# Patient Record
Sex: MALE | Race: WHITE
[De-identification: names, ages, dates, MRNs, and addresses within clinical notes are randomized per-mention and may not be internally consistent; named-entity substitution may affect disease eponyms.]

---

## 2016-02-14 VITALS — SYSTOLIC BLOOD PRESSURE: 113 MMHG | DIASTOLIC BLOOD PRESSURE: 69 MMHG

## 2019-01-18 ENCOUNTER — HOSPITAL ENCOUNTER (OUTPATIENT)
Dept: HOSPITAL 63 - US | Age: 59
Discharge: HOME | End: 2019-01-18
Attending: FAMILY MEDICINE
Payer: COMMERCIAL

## 2019-01-18 DIAGNOSIS — Z87.891: ICD-10-CM

## 2019-01-18 DIAGNOSIS — I73.89: Primary | ICD-10-CM

## 2019-01-18 DIAGNOSIS — E11.9: ICD-10-CM

## 2019-01-18 PROCEDURE — 93922 UPR/L XTREMITY ART 2 LEVELS: CPT

## 2019-01-18 NOTE — RAD
Ankle-brachial indices, 1/18/2019:

 

HISTORY: Peripheral vascular disease, smoker, diabetic

 

Resting CARMELINA measurements were obtained. Normal CARMELINA readings of 1.0 were 

obtained on both sides.

 

IMPRESSION: Normal resting CARMELINA measurements.

 

Electronically signed by: Rick Moritz, MD (1/18/2019 10:44 AM) [facility]

## 2019-12-02 ENCOUNTER — HOSPITAL ENCOUNTER (INPATIENT)
Dept: HOSPITAL 63 - ER | Age: 59
Discharge: HOME | DRG: 391 | End: 2019-12-02
Attending: INTERNAL MEDICINE | Admitting: FAMILY MEDICINE
Payer: COMMERCIAL

## 2019-12-02 VITALS — SYSTOLIC BLOOD PRESSURE: 130 MMHG | DIASTOLIC BLOOD PRESSURE: 76 MMHG

## 2019-12-02 VITALS — HEIGHT: 69 IN | WEIGHT: 164.44 LBS | BODY MASS INDEX: 24.36 KG/M2

## 2019-12-02 VITALS — DIASTOLIC BLOOD PRESSURE: 81 MMHG | SYSTOLIC BLOOD PRESSURE: 150 MMHG

## 2019-12-02 VITALS — DIASTOLIC BLOOD PRESSURE: 71 MMHG | SYSTOLIC BLOOD PRESSURE: 143 MMHG

## 2019-12-02 VITALS — SYSTOLIC BLOOD PRESSURE: 144 MMHG | DIASTOLIC BLOOD PRESSURE: 73 MMHG

## 2019-12-02 DIAGNOSIS — E87.2: ICD-10-CM

## 2019-12-02 DIAGNOSIS — K21.9: Primary | ICD-10-CM

## 2019-12-02 DIAGNOSIS — D72.829: ICD-10-CM

## 2019-12-02 DIAGNOSIS — F17.210: ICD-10-CM

## 2019-12-02 DIAGNOSIS — E83.42: ICD-10-CM

## 2019-12-02 DIAGNOSIS — L29.9: ICD-10-CM

## 2019-12-02 DIAGNOSIS — J44.9: ICD-10-CM

## 2019-12-02 DIAGNOSIS — N18.9: ICD-10-CM

## 2019-12-02 DIAGNOSIS — M19.90: ICD-10-CM

## 2019-12-02 DIAGNOSIS — I24.8: ICD-10-CM

## 2019-12-02 DIAGNOSIS — E78.00: ICD-10-CM

## 2019-12-02 DIAGNOSIS — Z85.46: ICD-10-CM

## 2019-12-02 DIAGNOSIS — I95.9: ICD-10-CM

## 2019-12-02 DIAGNOSIS — E11.22: ICD-10-CM

## 2019-12-02 DIAGNOSIS — I12.9: ICD-10-CM

## 2019-12-02 DIAGNOSIS — Z87.442: ICD-10-CM

## 2019-12-02 DIAGNOSIS — Z90.79: ICD-10-CM

## 2019-12-02 DIAGNOSIS — N17.0: ICD-10-CM

## 2019-12-02 LAB
ALBUMIN SERPL-MCNC: 3.5 G/DL (ref 3.4–5)
ALP SERPL-CCNC: 116 U/L (ref 46–116)
ALT SERPL-CCNC: 30 U/L (ref 16–63)
AMPHETAMINE/METHAMPHETAMINE: (no result)
AMYLASE SERPL-CCNC: 51 U/L (ref 25–115)
ANION GAP SERPL CALC-SCNC: 15 MMOL/L (ref 6–14)
APTT PPP: YELLOW S
AST SERPL-CCNC: 28 U/L (ref 15–37)
BACTERIA #/AREA URNS HPF: 0 /HPF
BARBITURATES UR-MCNC: (no result) UG/ML
BASOPHILS # BLD AUTO: 0 X10^3/UL (ref 0–0.2)
BASOPHILS NFR BLD: 0 % (ref 0–3)
BENZODIAZ UR-MCNC: (no result) UG/L
BILIRUB DIRECT SERPL-MCNC: 0.1 MG/DL (ref 0–0.2)
BILIRUB SERPL-MCNC: 0.5 MG/DL (ref 0.2–1)
BILIRUB UR QL STRIP: (no result)
CA-I SERPL ISE-MCNC: 25 MG/DL (ref 8–26)
CALCIUM SERPL-MCNC: 8.7 MG/DL (ref 8.5–10.1)
CANNABINOIDS UR-MCNC: (no result) UG/L
CHLORIDE SERPL-SCNC: 103 MMOL/L (ref 98–107)
CHOLEST/HDLC SERPL: 6 {RATIO}
CO2 SERPL-SCNC: 26 MMOL/L (ref 21–32)
COCAINE UR-MCNC: (no result) NG/ML
CREAT SERPL-MCNC: 1.5 MG/DL (ref 0.7–1.3)
EOSINOPHIL NFR BLD: 0.2 X10^3/UL (ref 0–0.7)
EOSINOPHIL NFR BLD: 2 % (ref 0–3)
ERYTHROCYTE [DISTWIDTH] IN BLOOD BY AUTOMATED COUNT: 12.8 % (ref 11.5–14.5)
FIBRINOGEN PPP-MCNC: CLEAR MG/DL
GFR SERPLBLD BASED ON 1.73 SQ M-ARVRAT: 47.7 ML/MIN
GLUCOSE SERPL-MCNC: 175 MG/DL (ref 70–99)
GLUCOSE UR STRIP-MCNC: 100 MG/DL
HCT VFR BLD CALC: 52.5 % (ref 39–53)
HDLC SERPL-MCNC: 32 MG/DL (ref 40–60)
HGB BLD-MCNC: 17.7 G/DL (ref 13–17.5)
INFLUENZA A PATIENT: NEGATIVE
INFLUENZA B PATIENT: NEGATIVE
LDLC: 92 MG/DL (ref 0–100)
LIPASE: 135 U/L (ref 73–393)
LYMPHOCYTES # BLD: 5.8 X10^3/UL (ref 1–4.8)
LYMPHOCYTES NFR BLD AUTO: 43 % (ref 24–48)
MAGNESIUM SERPL-MCNC: 1.7 MG/DL (ref 1.8–2.4)
MCH RBC QN AUTO: 33 PG (ref 25–35)
MCHC RBC AUTO-ENTMCNC: 34 G/DL (ref 31–37)
MCV RBC AUTO: 99 FL (ref 79–100)
METHADONE SERPL-MCNC: (no result) NG/ML
MONO #: 0.6 X10^3/UL (ref 0–1.1)
MONOCYTES NFR BLD: 5 % (ref 0–9)
NEUT #: 6.9 X10^3UL (ref 1.8–7.7)
NEUTROPHILS NFR BLD AUTO: 51 % (ref 31–73)
NITRITE UR QL STRIP: (no result)
OPIATES UR-MCNC: (no result) NG/ML
PCP SERPL-MCNC: (no result) MG/DL
PLATELET # BLD AUTO: 259 X10^3/UL (ref 140–400)
POTASSIUM SERPL-SCNC: 3.6 MMOL/L (ref 3.5–5.1)
PROT SERPL-MCNC: 6.8 G/DL (ref 6.4–8.2)
RBC # BLD AUTO: 5.33 X10^6/UL (ref 4.3–5.7)
RBC #/AREA URNS HPF: 0 /HPF (ref 0–2)
SODIUM SERPL-SCNC: 144 MMOL/L (ref 136–145)
SP GR UR STRIP: 1.02
SQUAMOUS #/AREA URNS LPF: (no result) /LPF
THYROID STIM HORMONE (TSH): 7.46 UIU/ML (ref 0.36–3.74)
TRIGL SERPL-MCNC: 384 MG/DL (ref 0–150)
UROBILINOGEN UR-MCNC: 0.2 MG/DL
VLDLC: 76 MG/DL (ref 0–40)
WBC # BLD AUTO: 13.5 X10^3/UL (ref 4–11)
WBC #/AREA URNS HPF: (no result) /HPF (ref 0–4)

## 2019-12-02 PROCEDURE — 93306 TTE W/DOPPLER COMPLETE: CPT

## 2019-12-02 PROCEDURE — 93970 EXTREMITY STUDY: CPT

## 2019-12-02 PROCEDURE — 96365 THER/PROPH/DIAG IV INF INIT: CPT

## 2019-12-02 PROCEDURE — 74019 RADEX ABDOMEN 2 VIEWS: CPT

## 2019-12-02 PROCEDURE — 85025 COMPLETE CBC W/AUTO DIFF WBC: CPT

## 2019-12-02 PROCEDURE — 81001 URINALYSIS AUTO W/SCOPE: CPT

## 2019-12-02 PROCEDURE — 96368 THER/DIAG CONCURRENT INF: CPT

## 2019-12-02 PROCEDURE — 85379 FIBRIN DEGRADATION QUANT: CPT

## 2019-12-02 PROCEDURE — 83605 ASSAY OF LACTIC ACID: CPT

## 2019-12-02 PROCEDURE — G0103 PSA SCREENING: HCPCS

## 2019-12-02 PROCEDURE — 80076 HEPATIC FUNCTION PANEL: CPT

## 2019-12-02 PROCEDURE — 83880 ASSAY OF NATRIURETIC PEPTIDE: CPT

## 2019-12-02 PROCEDURE — 87804 INFLUENZA ASSAY W/OPTIC: CPT

## 2019-12-02 PROCEDURE — 90471 IMMUNIZATION ADMIN: CPT

## 2019-12-02 PROCEDURE — 85730 THROMBOPLASTIN TIME PARTIAL: CPT

## 2019-12-02 PROCEDURE — 83036 HEMOGLOBIN GLYCOSYLATED A1C: CPT

## 2019-12-02 PROCEDURE — 87040 BLOOD CULTURE FOR BACTERIA: CPT

## 2019-12-02 PROCEDURE — 96366 THER/PROPH/DIAG IV INF ADDON: CPT

## 2019-12-02 PROCEDURE — 96372 THER/PROPH/DIAG INJ SC/IM: CPT

## 2019-12-02 PROCEDURE — 71045 X-RAY EXAM CHEST 1 VIEW: CPT

## 2019-12-02 PROCEDURE — 84443 ASSAY THYROID STIM HORMONE: CPT

## 2019-12-02 PROCEDURE — 82550 ASSAY OF CK (CPK): CPT

## 2019-12-02 PROCEDURE — 96375 TX/PRO/DX INJ NEW DRUG ADDON: CPT

## 2019-12-02 PROCEDURE — 90715 TDAP VACCINE 7 YRS/> IM: CPT

## 2019-12-02 PROCEDURE — 94640 AIRWAY INHALATION TREATMENT: CPT

## 2019-12-02 PROCEDURE — 84484 ASSAY OF TROPONIN QUANT: CPT

## 2019-12-02 PROCEDURE — 87340 HEPATITIS B SURFACE AG IA: CPT

## 2019-12-02 PROCEDURE — 96361 HYDRATE IV INFUSION ADD-ON: CPT

## 2019-12-02 PROCEDURE — 83735 ASSAY OF MAGNESIUM: CPT

## 2019-12-02 PROCEDURE — 93005 ELECTROCARDIOGRAM TRACING: CPT

## 2019-12-02 PROCEDURE — 94760 N-INVAS EAR/PLS OXIMETRY 1: CPT

## 2019-12-02 PROCEDURE — 76700 US EXAM ABDOM COMPLETE: CPT

## 2019-12-02 PROCEDURE — 86709 HEPATITIS A IGM ANTIBODY: CPT

## 2019-12-02 PROCEDURE — 85610 PROTHROMBIN TIME: CPT

## 2019-12-02 PROCEDURE — 80307 DRUG TEST PRSMV CHEM ANLYZR: CPT

## 2019-12-02 PROCEDURE — 80061 LIPID PANEL: CPT

## 2019-12-02 PROCEDURE — 80048 BASIC METABOLIC PNL TOTAL CA: CPT

## 2019-12-02 PROCEDURE — 86705 HEP B CORE ANTIBODY IGM: CPT

## 2019-12-02 PROCEDURE — 36415 COLL VENOUS BLD VENIPUNCTURE: CPT

## 2019-12-02 PROCEDURE — 82150 ASSAY OF AMYLASE: CPT

## 2019-12-02 PROCEDURE — 83690 ASSAY OF LIPASE: CPT

## 2019-12-02 PROCEDURE — 96367 TX/PROPH/DG ADDL SEQ IV INF: CPT

## 2019-12-02 PROCEDURE — 86803 HEPATITIS C AB TEST: CPT

## 2019-12-02 NOTE — RAD
EXAM: AP View of the chest

 

DATE: 12/2/2019 12:39 AM

 

INDICATION: Chest pain, vomiting

 

COMPARISON: No Prior

 

FINDINGS:

 

 

The heart is not enlarged.

 

Mediastinal and hilar contours are normal.

 

No focal parenchymal airspace opacity.

 

No pleural effusion or pneumothorax.

 

 

 

IMPRESSION:

1.  No radiographic evidence for acute cardiopulmonary process.

 

Electronically signed by: Haja Dao MD (12/2/2019 1:43 AM) [facility]

## 2019-12-02 NOTE — NUR
Patient admitted from the ER, DX of chest pain and itching. Went over plan of care with 
patient, patient voiced understanding. Call light within reach. Will continue to monitor. 
Patient denies pain.

## 2019-12-02 NOTE — CARD
MR#: [id number]

Account#: [id number]

Accession#: [id number]

Date of Study: 12/02/2019

Ordering Physician: CECILY GARCIA, 

Referring Physician: CECILY GARCIA 

Tech: Kaylene Martinez RDCS





--------------- APPROVED REPORT --------------





EXAM: Two-dimensional and M-mode echocardiogram with Doppler and color Doppler.



Other Information 

Quality : Good

Technically limited study due to  body habitus.



INDICATION

Chest Pain 

Elevated Troponin



2D DIMENSIONS 

RVDd2.5 (2.9-3.5cm)Left Atrium(2D)3.5 (1.6-4.0cm)

IVSd1.2 (0.7-1.1cm)Aortic Root(2D)3.0 (2.0-3.7cm)

LVDd3.7 (3.9-5.9cm)PWd1.1 (0.7-1.1cm)

LVDs2.4 (2.5-4.0cm)FS (%) 30.0 %

SV40.3 mlLVEF(%)60.0 (>50%)



Aortic Valve

AoV Peak Ash.169.2cm/sAoV VTI29.0cm

AO Peak GR.11.4mmHgAO Mean GR.6mmHg

ML   (VTI)2.53cm2



Mitral Valve

MV E Cbzmavor68.5cm/sMV DECEL [id number]

MV A Suvejdqr836.4cm/sE/A  Ratio0.8



Tricuspid Valve

TR P. Rlfmiqaw617qm/sRAP BUKMASOP1mfIv

TR Peak Gr.06seRxGIWA86jsIe



 LEFT VENTRICLE 

The left ventricle is normal size. There is mild concentric left ventricular hypertrophy. The left ve
ntricular systolic function is normal and the ejection fraction is within normal range. The Ejection 
Fraction is 55-60%. There is normal LV segmental wall motion. Transmitral Doppler flow pattern is Gra
de I-abnormal relaxation pattern.



 RIGHT VENTRICLE 

The right ventricle is normal size. The right ventricular systolic function is normal.



 ATRIA 

The left atrium size is normal. The right atrium size is normal. The interatrial septum is intact wit
h no evidence for an atrial septal defect or patent foramen ovale as noted on 2-D or Doppler imaging.




 AORTIC VALVE 

The aortic valve is not well visualized but appears to be functioning normally by Doppler interrogati
on. Doppler and Color Flow revealed no significant aortic regurgitation. There is no significant aort
ic valvular stenosis.



 MITRAL VALVE 

The mitral valve is calcified but opens well. Mitral annular calcification is mild. There is no evide
nce of mitral valve prolapse. There is no mitral valve stenosis. Doppler and Color Flow revealed no m
itral valve regurgitation noted.



 TRICUSPID VALVE 

The tricuspid valve is normal in structure and function. Doppler and Color Flow revealed trace tricus
pid regurgitation. The PA pressure was estimated at 20 mmHg. There is no tricuspid valve stenosis.



 PULMONIC VALVE 

The pulmonic valve is not well visualized. Doppler and Color Flow revealed no pulmonic valvular regur
gitation. There is no pulmonic valvular stenosis.



 GREAT VESSELS 

The aortic root is normal in size. The ascending aorta is normal in size. The IVC is normal in size a
nd collapses >50% with inspiration.



 PERICARDIAL EFFUSION 

There is no evidence of significant pericardial effusion.



Critical Notification

Critical Value: No



<Conclusion>

The left ventricular systolic function is normal and the ejection fraction is within normal range. Th
e Ejection Fraction is 55-60%.

There is mild concentric left ventricular hypertrophy.

There is normal LV segmental wall motion.



Signed by : Stephen Nicole, 

Electronically Approved : 12/02/2019 14:45:14

## 2019-12-02 NOTE — HP
ADMIT DATE:  2019



HISTORY OF PRESENT ILLNESS:  The patient is a 59-year-old  male patient

who presented to the Emergency Room with a complaint of itching all over, got

some epigastric and chest pain, has also some shortness of breath.  He noted his

skin was red and he stated that he has had diarrhea and vomiting.  He has also

hypertension and diffuse severe itching.  The patient denied any new foods or

drugs or chemicals or change in household cleaning agents.  He stated that his

primary discomfort is his itching and the patient stating this discomfort is

severe.  No history of previous similar presentation.  The patient is known to

have type 2 diabetes, prostate cancer, kidney stones and COPD, but denies any

previous heart attack.  Denied any history of ill contact or recent travel.  His

pain was mostly in the epigastric area and lower chest.  Denied any radiation. 

He was extensively investigated in the Emergency Room and his lab work showed

that his white cell count was slightly elevated at 13,500 with normal manual

differential.  His D-dimer was high at 0.81.  However, PT, INR and aPTT were

normal.  His chemistry showed that he has impaired kidney function, has lactic

acidosis with serum lactic acid of 3.8.  His first set of cardiac enzymes showed

troponin to be less than 0.017.  The patient was admitted for further evaluation

and treatment.  His urinalysis and tox screen were both negative and his

influenza A and B were both negative.



PAST MEDICAL HISTORY:  Significant for type 2 diabetes, COPD, nephrolithiasis

and prostate cancer.



PAST SURGICAL HISTORY:  Significant for left eye cataract extraction,

tonsillectomy, prostatectomy and arthroscopic surgery of the left knee.



ALLERGIES:  He has no known drug allergies.



MEDICATIONS:  He is currently on following medications:  He is on metformin

extended release 500 mg twice a day, glimepiride 4 mg twice a day, cinnamon bark

500 mg once a day.



FAMILY HISTORY:  He has 2 brothers and 1 sister older.  Does not know anything

about medical problems.  His father  at the age of 91.  Mother  at the

age of 86 because of endometrial cancer.



SOCIAL HISTORY:  He is , has 3 biological children and 1 step child.  He

smokes 1-1/2 pack a day.  Drives a truck.  He does drink alcohol very

occasionally.



REVIEW OF SYSTEMS:  As per history of present illness.



PHYSICAL EXAMINATION

GENERAL:  When I examined him, he looked well and was clearly in no apparent

respiratory distress.  No pallor, jaundice, cyanosis or thyromegaly.  No jugular

venous distention.  No limb edema.

VITAL SIGNS:  His heart rate was 97, blood pressure was 143/71, temperature was

97.8, respiratory rate 20, and oxygen saturation was 93% on room air.

HEAD, EYES, EARS, NOSE AND THROAT:  Showed normocephalic, atraumatic.

NECK:  Supple.

HEART:  Showed normal first and second heart sounds.  No gallop or murmur.

CHEST:  Clear to auscultation.  No crepitation or rhonchi.

ABDOMEN:  Distended, soft, nontender.

NEUROLOGIC:  He is awake, alert, responding appropriately.  All cranial nerves

intact.

EXTREMITIES:  He moves extremities without difficulty, ambulates without

assistance or assistive devices.



LABORATORY DATA:  Showed a white cell count of 13,500, hemoglobin 17.7,

hematocrit 52.5, MCV 99, and platelet count 259,000 with normal manual

differential.  His prothrombin time was 10.3, INR of 1, aPTT was 21.  D-dimer

was high at 0.81.  His chemistry showed a serum sodium 144, potassium 3.6,

chloride 103, bicarbonate 26, anion gap of 15, BUN 25, creatinine 1.5, estimated

GFR was 47 mL per minute, his glucose 175.  Lactic acid was 3.8, calcium was

8.7, magnesium was 1.7.  Total bilirubin, AST, ALT, alkaline phosphatase were

normal.  His first set of cardiac enzymes showed troponin to be less than 0.017.

 His beta natriuretic peptide was 98.  Total protein was 6.8, albumin 3.5. 

Amylase and lipase were normal.  Urinalysis was essentially unremarkable and

toxic screen was negative.  His influenza A and B were negative.  He did have a

chest x-ray, which showed that mediastinal and hilar contours are normal.  The

heart size is normal.  No focal parenchymal airspace opacity.  No pleural

effusion or pneumothorax.  He did have abdominal x-ray, which showed no abnormal

small or large bowel dilatation, moderate colonic stool content.  No abnormal

soft tissue mass effect, no suspicious calcification are seen.  No free

intraperitoneal gas.  Surgical clips in the pelvis are seen.  Hip joint

degenerative changes are noted.  The abdominal ultrasound was unremarkable and

bilateral lower extremity ultrasounds also showed there is normal Duplex flow,

color flow, and compressibility of all visualized vein segments.  No evidence of

deep vein thrombosis is present.



ASSESSMENT AND PLAN:  The patient was admitted to do 2 more sets of cardiac

enzymes and to consult the Cardiology team.  The patient seemed to have some

form of severe allergic reaction.  There is generalized itching, hypertension. 

The plan is to continue the IV fluids.  We will do 2 more sets of cardiac

enzyme, consult the Cardiology team and decide the further management

accordingly.





______________________________

KAILYN GONZALEZ MD



DR:  WILLIAM/tamara  JOB#:  [id number] / [id number]

DD:  2019 11:10  DT:  2019 11:25

## 2019-12-02 NOTE — PDOC2
CECILY GARCIA APRN 12/2/19 0734:


CARDIAC CONSULT


DATE OF CONSULT


Date Of Consult


DATE: 12/2/19 


TIME: 07:31





REASON FOR CONSULT


Reason for Consult


Chest pain 


Hypotension





REFERRING PHYSICIAN


Referring Physician


Dr. Greene





SOURCE


Source:  Chart review, Patient





HPI


History of Present Illness


This is a 58 yo male who presented secondary to itching, epigastric chest pain, 

nausea/vomiting, diarrhea. Patient reports he went to bed as usual last night 

around 11:00 pm. Had difficulty falling asleep. About an hour later, began 

having significant, diffuse itching. No redness of rash was noted. Went outside 

to see if the could temperature would help the itching. Came back inside and has

a bowel movement. Itching persisted. Noticed that he was sweating. Began having 

dull aching pain in his central chest. Felt a little short of breath and 

nauseated. Woke wife up and she was concerned about the chest pain so she 

brought him into the ED for further evaluation and treatment. Symptoms improved 

without intervention prior to arrival to the ED. Itching resolved completely 

with Benadryl in the ED. No further chest pain, shortness of breath, nausea, 

diaphoresis, or itching since arrival to the ED. Denies any new foods, 

medication, etc...


No prior h/o CAD.





PAST MEDICAL HISTORY


Cardiovascular:  HTN


Pulmonary:  COPD


Renal/:  Prostate Ca.


Endocrine:  Diabetes





PAST SURGICAL HISTORY


Past Surgical History


prostatectomy


Past Surgical History:  Cataract Removal





FAMILY HISTORY


Family History:  Family History Unknown





SOCIAL HISTORY


Smoke:  1 pack per day


ALCOHOL:  rare


Drugs:  None


Lives:  with Family





CURRENT MEDICATIONS


Current Medications





Current Medications


Lactated Ringer's 1,000 ml @  100 mls/hr Q10H IV  Last administered on 12/2/19at

01:03;  Start 12/2/19 at 00:39;  Stop 12/2/19 at 10:38


Ondansetron HCl (Zofran) 8 mg 1X  ONCE IVP  Last administered on 12/2/19at 

01:03;  Start 12/2/19 at 00:45;  Stop 12/2/19 at 02:53;  Status DC


Famotidine (Pepcid) 20 mg 1X  ONCE PO  Last administered on 12/2/19at 01:04;  

Start 12/2/19 at 00:45;  Stop 12/2/19 at 02:52;  Status DC


Methylprednisolone Sodium Succinate (SOLU-Medrol 125MG VIAL) 125 mg 1X  ONCE IV 

Last administered on 12/2/19at 01:03;  Start 12/2/19 at 01:00;  Stop 12/2/19 at 

02:53;  Status DC


Albuterol/ Ipratropium (Duoneb) 3 ml 1X  ONCE NEB  Last administered on 

12/2/19at 01:24;  Start 12/2/19 at 01:00;  Stop 12/2/19 at 02:52;  Status DC


Diphenhydramine HCl (Benadryl) 50 mg 1X  ONCE IVP  Last administered on 

12/2/19at 01:03;  Start 12/2/19 at 01:00;  Stop 12/2/19 at 02:50;  Status DC


Aspirin (Children'S Aspirin) 324 mg 1X  ONCE PO  Last administered on 12/2/19at 

01:04;  Start 12/2/19 at 01:00;  Stop 12/2/19 at 02:51;  Status DC


Lactated Ringer's 1,000 ml @  1,000 mls/hr 1X  ONCE IV  Last administered on 

12/2/19at 02:29;  Start 12/2/19 at 01:15;  Stop 12/2/19 at 02:51;  Status DC


Ceftriaxone Sodium 1 gm/ Sodium Chloride 50 ml @  100 mls/hr 1X  ONCE IV  Last 

administered on 12/2/19at 02:00;  Start 12/2/19 at 01:30;  Stop 12/2/19 at 

02:50;  Status DC


Ceftriaxone Sodium (Rocephin) 1 gm STK-MED ONCE .ROUTE ;  Start 12/2/19 at 

01:52;  Stop 12/2/19 at 01:52;  Status DC


Sodium Chloride 50 ml @ As Directed STK-MED ONCE .ROUTE ;  Start 12/2/19 at 

01:52;  Stop 12/2/19 at 01:53;  Status DC


Metronidazole 100 ml @  100 mls/hr 1X  ONCE IV  Last administered on 12/2/19at 

02:51;  Start 12/2/19 at 02:15;  Stop 12/2/19 at 03:14;  Status DC


Enoxaparin Sodium (Lovenox 60mg Syringe) 60 mg 1X  ONCE SQ  Last administered on

12/2/19at 03:27;  Start 12/2/19 at 02:30;  Stop 12/2/19 at 02:50;  Status DC


Magnesium Sulfate 50 ml @ 25 mls/hr 1X  ONCE IV  Last administered on 12/2/19at 

02:51;  Start 12/2/19 at 02:15;  Stop 12/2/19 at 04:14;  Status DC


Lactated Ringer's 1,000 ml @  1,000 mls/hr 1X  ONCE IV  Last administered on 

12/2/19at 03:58;  Start 12/2/19 at 02:15;  Stop 12/2/19 at 03:14;  Status DC


Ondansetron HCl (Zofran) 4 mg PRN Q4HRS  PRN IV NAUSEA/VOMITING;  Start 12/2/19 

at 02:15;  Stop 12/3/19 at 02:14


Acetaminophen (Tylenol) 650 mg PRN Q4HRS  PRN PO FEVER;  Start 12/2/19 at 02:15;

 Stop 12/3/19 at 02:14


Albuterol/ Ipratropium (Duoneb) 3 ml RTQID NEB  Last administered on 12/2/19at 

05:29;  Start 12/2/19 at 08:00;  Stop 12/3/19 at 07:59


Lactated Ringer's 1,000 ml @  200 mls/hr Q5H IV ;  Start 12/2/19 at 02:15;  Stop

12/2/19 at 03:43;  Status DC


Aspirin (Children'S Aspirin) 81 mg DAILYWBKFT PO ;  Start 12/2/19 at 08:00


Ceftriaxone Sodium 1 gm/ Sodium Chloride 50 ml @ 0 mls/hr Q24H IV ;  Start 

12/2/19 at 21:00


Metronidazole 100 ml @  100 mls/hr Q8HRS IV ;  Start 12/2/19 at 06:00


Famotidine (Pepcid Vial) 20 mg BID IVP ;  Start 12/2/19 at 09:00;  Status UNV


Diphenhydramine HCl (Benadryl) 50 mg QID IV ;  Start 12/2/19 at 09:00


Famotidine (Pepcid Vial) 20 mg DAILY IVP ;  Start 12/2/19 at 09:00


Diphtheria/ Tetanus/Acell Pertussis (Boostrix) 0.5 ml ONCE ONCE VAX IM  Last 

administered on 12/2/19at 03:47;  Start 12/2/19 at 03:00;  Stop 12/2/19 at 

03:01;  Status DC


Albuterol/ Ipratropium (Duoneb) 3 ml STK-MED ONCE .ROUTE ;  Start 12/2/19 at 

05:10;  Stop 12/2/19 at 05:11;  Status DC


Metoprolol Succinate (Toprol Xl) 50 mg Q24H PO ;  Start 12/2/19 at 07:30


Enoxaparin Sodium (Lovenox 80mg Syringe) 70 mg Q12HR SQ ;  Start 12/2/19 at 

09:00





Active Scripts


Active


Reported


Cinnamon (Cinnamon Bark) 500 Mg Capsule 500 Mg PO DAILY


Glimepiride 4 Mg Tablet 4 Mg PO BID


Metformin Hcl Er (Metformin Hcl) 500 Mg Tab.er.24h 500 Mg PO BID





ALLERGIES


Allergies:  


Coded Allergies:  


     No Known Drug Allergies (Unverified , 2/14/16)





ROS


Review of Systems


14 point ROS conducted with pertinent positives noted above in HPI





PHYSICAL EXAM


General:  Alert, Oriented X3, Cooperative, No acute distress


HEENT:  Atraumatic, Mucous membr. moist/pink


Lungs:  Clear to auscultation, Normal air movement


Heart:  Regular rate, Normal S1, Normal S2, Other (2/6 systolic murmur )


Abdomen:  Soft


Extremities:  No edema, Normal pulses


Skin:  No breakdown


Neuro:  Normal speech, Sensation intact


Psych/Mental Status:  Mental status NL, Mood NL


MUSCULOSKELETAL:  Osteoarthritic changes both hands





VITALS


Vital Signs





Vital Signs








  Date Time  Temp Pulse Resp B/P (MAP) Pulse Ox O2 Delivery O2 Flow Rate FiO2


 


12/2/19 05:50 97.8 97 20 143/71 (95) 93 Room Air  


 


12/2/19 02:43       2.0 











LABS


LABS





Laboratory Tests








Test


 12/2/19


00:49 12/2/19


01:36 12/2/19


02:06 12/2/19


03:15


 


White Blood Count


 13.5 x10^3/uL


(4.0-11.0) 


 


 





 


Red Blood Count


 5.33 x10^6/uL


(4.30-5.70) 


 


 





 


Hemoglobin


 17.7 g/dL


(13.0-17.5) 


 


 





 


Hematocrit


 52.5 %


(39.0-53.0) 


 


 





 


Mean Corpuscular Volume 99 fL ()    


 


Mean Corpuscular Hemoglobin 33 pg (25-35)    


 


Mean Corpuscular Hemoglobin


Concent 34 g/dL


(31-37) 


 


 





 


Red Cell Distribution Width


 12.8 %


(11.5-14.5) 


 


 





 


Platelet Count


 259 x10^3/uL


(140-400) 


 


 





 


Neutrophils (%) (Auto) 51 % (31-73)    


 


Lymphocytes (%) (Auto) 43 % (24-48)    


 


Monocytes (%) (Auto) 5 % (0-9)    


 


Eosinophils (%) (Auto) 2 % (0-3)    


 


Basophils (%) (Auto) 0 % (0-3)    


 


Neutrophils # (Auto)


 6.9 x10^3uL


(1.8-7.7) 


 


 





 


Lymphocytes # (Auto)


 5.8 x10^3/uL


(1.0-4.8) 


 


 





 


Monocytes # (Auto)


 0.6 x10^3/uL


(0.0-1.1) 


 


 





 


Eosinophils # (Auto)


 0.2 x10^3/uL


(0.0-0.7) 


 


 





 


Basophils # (Auto)


 0.0 x10^3/uL


(0.0-0.2) 


 


 





 


Prothrombin Time


 10.3 SEC


(9.4-11.4) 


 


 





 


Prothromb Time International


Ratio 1.0 (0.9-1.1) 


 


 


 





 


Activated Partial


Thromboplast Time 21 SEC (23-33) 


 


 


 





 


D-Dimer (Evelyne)


 0.81 mg/L


(0.00-0.50) 


 


 





 


Sodium Level


 144 mmol/L


(136-145) 


 


 





 


Potassium Level


 3.6 mmol/L


(3.5-5.1) 


 


 





 


Chloride Level


 103 mmol/L


() 


 


 





 


Carbon Dioxide Level


 26 mmol/L


(21-32) 


 


 





 


Anion Gap 15 (6-14)    


 


Blood Urea Nitrogen


 25 mg/dL


(8-26) 


 


 





 


Creatinine


 1.5 mg/dL


(0.7-1.3) 


 


 





 


Estimated GFR


(Cockcroft-Gault) 47.7 


 


 


 





 


Glucose Level


 175 mg/dL


(70-99) 


 


 





 


Calcium Level


 8.7 mg/dL


(8.5-10.1) 


 


 





 


Magnesium Level


 1.7 mg/dL


(1.8-2.4) 


 


 





 


Total Bilirubin


 0.5 mg/dL


(0.2-1.0) 


 


 





 


Direct Bilirubin


 0.1 mg/dL


(0.0-0.2) 


 


 





 


Aspartate Amino Transf


(AST/SGOT) 28 U/L (15-37) 


 


 


 





 


Alanine Aminotransferase


(ALT/SGPT) 30 U/L (16-63) 


 


 


 





 


Alkaline Phosphatase


 116 U/L


() 


 


 





 


Creatine Kinase


 134 U/L


() 


 


 





 


Troponin I Quantitative


 < 0.017 ng/mL


(0-0.055) 


 


 





 


NT-Pro-B-Type Natriuretic


Peptide 98 pg/mL


(0-124) 


 


 





 


Total Protein


 6.8 g/dL


(6.4-8.2) 


 


 





 


Albumin


 3.5 g/dL


(3.4-5.0) 


 


 





 


Amylase Level


 51 U/L


() 


 


 





 


Lipase


 135 U/L


() 


 


 





 


Ethyl Alcohol Level


 < 10 mg/dL


(0-10) 


 


 





 


Lactic Acid Level


 


 3.8 mmol/L


(0.4-2.0) 


 





 


Influenza Type A (Rapid)


 


 


 Negative


(NEGATIVE) 





 


Influenza Type B (Rapid)


 


 


 Negative


(NEGATIVE) 





 


Urine Collection Type    Unknown 


 


Urine Color    Yellow 


 


Urine Clarity    Clear 


 


Urine pH    6.0 


 


Urine Specific Gravity    1.020 


 


Urine Protein


 


 


 


 Neg


(NEG-TRACE)


 


Urine Glucose (UA)


 


 


 


 100 mg/dL


(NEG)


 


Urine Ketones (Stick)


 


 


 


 Neg mg/dL


(NEG)


 


Urine Blood    Neg (NEG) 


 


Urine Nitrite    Neg (NEG) 


 


Urine Bilirubin    Neg (NEG) 


 


Urine Urobilinogen Dipstick


 


 


 


 0.2 mg/dL (0.2


mg/dL)


 


Urine Leukocyte Esterase    Neg (NEG) 


 


Urine RBC    0 /HPF (0-2) 


 


Urine WBC    Occ /HPF (0-4) 


 


Urine Squamous Epithelial


Cells 


 


 


 Occ /LPF 





 


Urine Bacteria    0 /HPF (0-FEW) 


 


Urine Opiates Screen    Neg (NEG) 


 


Urine Methadone Screen    Neg (NEG) 


 


Urine Barbiturates    Neg (NEG) 


 


Urine Phencyclidine Screen    Neg (NEG) 


 


Urine


Amphetamine/Methamphetamine 


 


 


 Neg (NEG) 





 


Urine Benzodiazepines Screen    Neg (NEG) 


 


Urine Cocaine Screen    Neg (NEG) 


 


Urine Cannabinoids Screen    Neg (NEG) 


 


Urine Ethyl Alcohol    Neg (NEG) 


 


Test


 12/2/19


05:30 


 


 





 


Lactic Acid Level


 2.7 mmol/L


(0.4-2.0) 


 


 





 


Troponin I Quantitative


 0.126 ng/mL


(0-0.055) 


 


 














ASSESSMENT/PLAN


Assessment/Plan


1.  Pruritis; resolved 


2.  Chest pain, atypical


3.  Mild troponin elevation; highest 0.126. Probable type II, demand ischemia


4.  Hypertension; controlled overall


5.  Diabetes, II


6.  Leukocytosis, lactic acidosis; ? allergic reaction 


7.  ESTELA versus CKD; IVFs


8.  Tobaccoism; discussed/encouraged cessation








Recommendations





Lipids, TSH


Trend troponin


Echo to assess LV systolic function


Will need further ischemic workup based upon risk factors, possibly as an 

outpatient.


Further pending above





EMMA LUTZ MD 12/2/19 1519:


CARDIAC CONSULT


ASSESSMENT/PLAN


Assessment/Plan


Patient seen and examined.  Agree with NP's assessment and plan.


CP with atypical features.


Slight trop elevation probably demand ischemia


2D echo showed normal LVF without any WMA


Plan ischemic evaluation as an outpatient


Thank you for your consultation











CECILY GARCIA            Dec 2, 2019 07:34


EMMA LUTZ MD            Dec 2, 2019 16:43

## 2019-12-02 NOTE — RAD
Examination:  Bilateral Lower Extremity Venous Doppler Ultrasound

 

History: Elevated d-dimer

 

Comparison: None

 

Procedure: Gray scale, color flow 2D and spectal waveform analysis images 

are obtained with and without compression in the area of the common 

femoral vein, superficial femoral vein - femoral vein junction, main 

femoral vein (superficial femoral vein) and popliteal vein. Veins of the 

proximal calf are also imaged.

 

Findings:

 

There is normal duplex flow, color flow and compressibility of all 

visualized vein segments. No evidence of deep venous thrombus is present.

 

Impression: 

 

No evidence of DVT in the visualized bilateral lower extremity venous 

system.

 

Electronically signed by: Filipe Woo MD (12/2/2019 8:44 AM) [id number]

## 2019-12-02 NOTE — EKG
[facility] [address], [place] [address]

Test Date:    2019               Test Time:    07:13:04

Pat Name:     PARUL CATHERINE              Department:   

Patient ID:   [id number]           Room:          

Gender:       M                        Technician:   

:          [id number]               Requested By: ELBA VELEZ

Order Number: [id number]            Reading MD:     

                                 Measurements

Intervals                              Axis          

Rate:         93                       P:            52

NY:           140                      QRS:          59

QRSD:         76                       T:            34

QT:           356                                    

QTc:          445                                    

                           Interpretive Statements

SINUS RHYTHM

NORMAL ECG

RI6.02

No previous ECG available for comparison

## 2019-12-02 NOTE — RAD
EXAM: AP views of the abdomen in upright and supine positions. 

 

CLINICAL INDICATION: Nausea, vomiting, pain, tremors

 

COMPARISON: None.

 

FINDINGS and 

IMPRESSION: 

No abnormal small or large bowel dilatation.  Moderate colonic stool 

content.  No abnormal soft tissue mass effect.  No suspicious 

calcifications are seen.  No free intraperitoneal gas. Surgical clips in 

the pelvis are seen. Hip joint degenerative changes are noted.

 

Electronically signed by: Haja Dao MD (12/2/2019 2:05 AM) [facility]

## 2019-12-02 NOTE — RAD
Complete abdomen ultrasound study

 

Clinical indications: Elevated d-dimer. Dyspnea. History of renal stone.

 

FINDINGS: The pancreas is poorly visualized due to overlying bowel gas. No

obvious focal enlargement of the pancreas is seen otherwise. The 

intrahepatic portion of the IVC is unremarkable. No focal aneurysmal 

dilatation of the abdominal aorta is seen. The liver measures 17.3 cm in 

length which is normal. No focal hepatic mass is seen. No gallstone is 

seen within the gallbladder. The gallbladder wall measures 2 mm in 

thickness which is normal. The extra hepatic bile duct measures 4 mm in 

caliber which is normal. The length of the right kidney is 11.2 cm and the

length of the left kidney is 11.0 cm. No hydronephrosis or renal mass or 

perinephric fluid collection is seen on either side. The spleen measures 

10 cm in length which is normal. No ascites is seen within the upper 

abdomen.

 

IMPRESSION: Unremarkable study.

 

Electronically signed by: Leander Jiang MD (12/2/2019 8:53 AM) [facility]

## 2019-12-02 NOTE — NUR
NURSING NOTE



PT DISCHARGED TO HOME VIA AMBULATION ACCOMPANIED BY WIFE. PT GIVEN WRITTEN AND VERBAL 
DISCHARGE INSTRUCTIONS AND TO FOLLOW UP WITH CARDIOLOGY AND PCP. NO SCRIPTS GIVEN.



MINNIE PENG.

## 2019-12-02 NOTE — PHYS DOC
Past History


Past Medical History:  Arthritis, Cancer, COPD, Diabetes, GERD, High 

Cholesterol, Kidney Stones, Other


Past Medical History


Prostate CA


Past Surgical History:  Cancer Surgery, Other


Past Surgical History


Prostatectomy 


Smoking:  Cigarettes


Alcohol Use:  Rarely


Drug Use:  None





Adult General


Chief Complaint


Chief Complaint:  ".. I was just laying in bed.. and it felt like my upper lip 

was numb.. and I started itching every where.. got this epigastric pain.. chest 

pain.. some shortness of breath.. I could get comfortable.. I notice my skin is 

red.... I feel like shit...."  Pt.   " He was fine at 9 pm.. then woke me up not

fine...I guess he does not want me to get any sleep...We ate the same thing .. 

no other changes..." Wife





HPI


HPI





Patient is a 59 year old male who presents with above hx and complaints of 

chest- epigastric pain, nausea, dyspnea, vomiting, diarrhea, tachycardia, 

chills, hypotension and diffuse severe itching.  Patient denies any new foods, 

drugs, chemicals or changes in household cleaning agents. Patient states his 

primary discomfort is his itching. Patient states this discomfort is severe.  No

history of previous type presentations.  Patient does have a history of 

diabetes, prostate cancer, kidney stones and COPD. Patient denies previous heart

attacks. Patient does  not have a history of  ill contacts or travel.  Patient 

does smoke heavily ,with estimated over 100 pack yr hx.  Pt. localizes chest 

pain in epigastric lower sternal  area.  Some radiation up under the sternum.  

Patient works at a local [employer]. Patient follows with Dr. Lee





Review of Systems


Review of Systems





Constitutional: Acute rigors and  chills []


Eyes: Denies change in visual acuity, redness, or eye pain []


HENT: Denies nasal congestion or sore throat []


Respiratory: History of cough and shortness of breath []


Cardiovascular: No additional information not addressed in HPI []


GI: Epigastric abdominal pain, nausea, vomiting,. Denies bloody stools or 

diarrhea []Did have some diarrhea at home earlier tonight. 


: Denies dysuria or hematuria []


Musculoskeletal: Denies back pain or joint pain []


Integument: Complaints of diffuse itching and erythema- acute onset


Neurologic: Denies headache, focal weakness or sensory changes []


Endocrine: Denies polyuria or polydipsia []





All other systems were reviewed and found to be within normal limits, except as 

documented in this note.





Family History


Family History


Noncontributory





Current Medications


Current Medications


See nursing for home meds





Allergies


Allergies





Allergies








Coded Allergies Type Severity Reaction Last Updated Verified


 


  No Known Drug Allergies    16 No











Physical Exam


Physical Exam





Constitutional: In acute distress, appears to be having an allergic reaction in 

appearance- diffuse itching and  head to toe erythema. (Skin appearance of 

someone that took large dose of Niacin). The patient is hypotensive.. 


HENT: Normocephalic, atraumatic, bilateral external ears normal, oropharynx 

moist, no oral exudates, nose swollen and clear rhinorrhea.  Nicotine stained 

beard and mustache


Eyes: PERRLA, EOMI, conjunctiva normal, no discharge. [] 


Neck: Normal range of motion, no tenderness, supple, no stridor. [] 


Cardiovascular: Tachycardia Heart rate, regular rhythm, no murmur. PMI to the 

left


Lungs & Thorax:  Bilateral breath sounds equal apex with scattered wheezes on 

auscultation []


Abdomen: Bowel sounds  decreased, soft, epigastric and right upper quadrant 

tenderness, no masses, no pulsatile masses. [] Large midline pelvic prostate 

surgery scar.  Did vomit partially digested food.  Rebound to Rt. upper 

quadrant.


Skin: Warm, dry, diffuse erythema, couple small scratches right forearm, oncotic

 toenails.


Back: No tenderness, no CVA tenderness. [] 


Extremities: No tenderness, no cyanosis, no clubbing, ROM intact, no edema. 

Arthritic changes.  Poor distal pedal pulses bilaterally.  Complaints of 

generalized weakness. 


Neurologic: Alert and oriented X 3, moves extremities on request. Has distal 

sensory .no gross focal deficits noted. []Dizzy. 


Psychologic: Affect anxious, judgement normal, mood normal. []





EKG


EKG


My interpretation EKG shows a sinus tachycardia at 103 bpm. No findings of acute

 STEMI with contralateral changes[]





Radiology/Procedures


Radiology/Procedures


[][facility] [address], [place] [address] [phone]


                                        


                                 IMAGING REPORT





                                     Signed





PATIENT: PARUL CATHERINE    ACCOUNT: [id number]     MRN#: [id number]


: 1960           LOCATION: ER              AGE: 59


SEX: M                    EXAM DT: 19         ACCESSION#: [id number]


STATUS: REG ER            ORD. PHYSICIAN: ELBA VELEZ MD


REASON: chest pain, VOMITING


PROCEDURE: PORTABLE CHEST 1V





EXAM: AP View of the chest


 


DATE: 2019 12:39 AM


 


INDICATION: Chest pain, vomiting


 


COMPARISON: No Prior


 


FINDINGS:


 


 


The heart is not enlarged.


 


Mediastinal and hilar contours are normal.


 


No focal parenchymal airspace opacity.


 


No pleural effusion or pneumothorax.


 


 


 


IMPRESSION:


1.  No radiographic evidence for acute cardiopulmonary process.


 


Electronically signed by: Haja Nagy MD (2019 1:43 AM) [facility]














DICTATED AND SIGNED BY:     HAJA NAGY MD


DATE:     19 0143





CC: VILMA ELE MD; ELBA VELEZ MD ~


[facility] [address], [place] [address]


                                 [phone]


                                        


                                 IMAGING REPORT





                                     Signed





PATIENT: PARUL CATHERINE    ACCOUNT: [id number]     MRN#: [id number]


: 1960           LOCATION: ER              AGE: 59


SEX: M                    EXAM DT: 19         ACCESSION#: [id number]


STATUS: REG ER            ORD. PHYSICIAN: ELBA VELEZ MD


REASON: nv, pain, TREMORS


PROCEDURE: ABDOMEN SUPINE & UPRIGHT





EXAM: AP views of the abdomen in upright and supine positions. 


 


CLINICAL INDICATION: Nausea, vomiting, pain, tremors


 


COMPARISON: None.


 


FINDINGS and 


IMPRESSION: 


No abnormal small or large bowel dilatation.  Moderate colonic stool 


content.  No abnormal soft tissue mass effect.  No suspicious 


calcifications are seen.  No free intraperitoneal gas. Surgical clips in 


the pelvis are seen. Hip joint degenerative changes are noted.


 


Electronically signed by: Haja Nagy MD (2019 2:05 AM) [facility]














DICTATED AND SIGNED BY:     HAJA NAGY MD


DATE:     19 0205





CC: VILMA LEE MD; ELBA VELEZ MD ~





Course & Med Decision Making


Course & Med Decision Making


Pertinent Labs and Imaging studies reviewed. (See chart for details)





Patient's initial severe itching and erythema  resolved with IV Solu-Medrol, 

Pepcid, Benadryl and breathing treatments.  Patient's hypotension resolved with 

fluid boluses x 2 Lit. LR..  Patient continue to have rigors and nausea and 

vomiting.  Vomiting appear to be partial digested food.





Heart score = 6 





Patient to be admitted to Dr. Campos for further evaluation and treatment. 

We will complete fluid boluses up to 30 mL/kg.  Supplement is hypo-magnesium. 

Obtain cardiology consult for his epigastric chest pain and hypotension.  We 

will cover with Rocephin and Flagyl antibiotics at this time.  We'll obtain 

ultrasound legs and abdomen on this admission.  Will hold Metformin- may need CT

 with contrast. 





Impression:





1. Hypotension


2. Severe itching-erythema-? Allergic reaction?


3. Diabetes. Glucose 175


4. Leukocytosis 13.5


5. Elevated lactic acid 3.8


6. SIRs vs Sepsis


7. Elevated Creat. 1.5


8. Hypomagnesium 1.7


9. Chest Pain-   


10. Abdomen Pain- Nausea and Vomiting


11. Hx. Postate CA- S/P prostatectomy 


12. Elevate D-dimer  0.81





[]





Dragon Disclaimer


Dragon Disclaimer


This electronic medical record was generated, in whole or in part, using a voice

 recognition dictation system.





Departure


Departure:


Disposition:  01 HOME/RESIDENCE PRIOR TO ADM


Condition:  STABLE


Referrals:  


VILMA LEE MD (PCP)





Mary Disclaimer


This chart was dictated in whole or in part using Voice Recognition software in 

a busy, high-work load, and often noisy Emergency Department environment.  It 

may contain unintended and wholly unrecognized errors or omissions.











ELBA VELEZ MD            Dec 2, 2019 00:38

## 2019-12-03 LAB — HBA1C MFR BLD: 7.4 % (ref 4.8–5.6)

## 2019-12-03 NOTE — EKG
[facility] [address], [place] [address]

Test Date:    2019               Test Time:    00:41:52

Pat Name:     PARUL CATHERINE              Department:   

Patient ID:   [id number]           Room:         115 A

Gender:       M                        Technician:   

:          1960               Requested By: LISSET FLAHERTY

Order Number: [id number]            Reading MD:     

                                 Measurements

Intervals                              Axis          

Rate:         103                      P:            62

SC:           140                      QRS:          76

QRSD:         76                       T:            48

QT:           328                                    

QTc:          432                                    

                           Interpretive Statements

SINUS TACHYCARDIA

OTHERWISE NORMAL ECG

RI6.01

No previous ECG available for comparison

## 2022-02-23 ENCOUNTER — HOSPITAL ENCOUNTER (EMERGENCY)
Dept: HOSPITAL 63 - ER | Age: 62
Discharge: HOME | End: 2022-02-23
Payer: COMMERCIAL

## 2022-02-23 VITALS — BODY MASS INDEX: 23.35 KG/M2 | HEIGHT: 69 IN | WEIGHT: 157.63 LBS

## 2022-02-23 VITALS — DIASTOLIC BLOOD PRESSURE: 85 MMHG | SYSTOLIC BLOOD PRESSURE: 167 MMHG

## 2022-02-23 DIAGNOSIS — E78.00: ICD-10-CM

## 2022-02-23 DIAGNOSIS — R41.0: ICD-10-CM

## 2022-02-23 DIAGNOSIS — K21.9: ICD-10-CM

## 2022-02-23 DIAGNOSIS — R41.3: Primary | ICD-10-CM

## 2022-02-23 DIAGNOSIS — J44.9: ICD-10-CM

## 2022-02-23 DIAGNOSIS — E11.9: ICD-10-CM

## 2022-02-23 DIAGNOSIS — F17.210: ICD-10-CM

## 2022-02-23 DIAGNOSIS — Z87.442: ICD-10-CM

## 2022-02-23 DIAGNOSIS — M19.90: ICD-10-CM

## 2022-02-23 LAB
AMPHETAMINE/METHAMPHETAMINE: (no result)
ANION GAP SERPL CALC-SCNC: 5 MMOL/L (ref 6–14)
APTT PPP: YELLOW S
BACTERIA #/AREA URNS HPF: 0 /HPF
BARBITURATES UR-MCNC: (no result) UG/ML
BASOPHILS # BLD AUTO: 0 X10^3/UL (ref 0–0.2)
BASOPHILS NFR BLD: 0 % (ref 0–3)
BENZODIAZ UR-MCNC: (no result) UG/L
CA-I SERPL ISE-MCNC: 23 MG/DL (ref 8–26)
CALCIUM SERPL-MCNC: 9 MG/DL (ref 8.5–10.1)
CANNABINOIDS UR-MCNC: (no result) UG/L
CHLORIDE SERPL-SCNC: 99 MMOL/L (ref 98–107)
CO2 SERPL-SCNC: 31 MMOL/L (ref 21–32)
COCAINE UR-MCNC: (no result) NG/ML
CREAT SERPL-MCNC: 1.1 MG/DL (ref 0.7–1.3)
EOSINOPHIL NFR BLD: 0 % (ref 0–3)
EOSINOPHIL NFR BLD: 0 X10^3/UL (ref 0–0.7)
ERYTHROCYTE [DISTWIDTH] IN BLOOD BY AUTOMATED COUNT: 12.6 % (ref 11.5–14.5)
FIBRINOGEN PPP-MCNC: (no result) MG/DL
GFR SERPLBLD BASED ON 1.73 SQ M-ARVRAT: 67.8 ML/MIN
GLUCOSE SERPL-MCNC: 172 MG/DL (ref 70–99)
GLUCOSE UR STRIP-MCNC: (no result) MG/DL
HCT VFR BLD CALC: 49.7 % (ref 39–53)
HGB BLD-MCNC: 17 G/DL (ref 13–17.5)
HYALINE CASTS #/AREA URNS LPF: (no result) /HPF
LYMPHOCYTES # BLD: 1.5 X10^3/UL (ref 1–4.8)
LYMPHOCYTES NFR BLD AUTO: 12 % (ref 24–48)
MCH RBC QN AUTO: 33 PG (ref 25–35)
MCHC RBC AUTO-ENTMCNC: 34 G/DL (ref 31–37)
MCV RBC AUTO: 97 FL (ref 79–100)
METHADONE SERPL-MCNC: (no result) NG/ML
MONO #: 0.6 X10^3/UL (ref 0–1.1)
MONOCYTES NFR BLD: 5 % (ref 0–9)
NEUT #: 10.2 X10^3UL (ref 1.8–7.7)
NEUTROPHILS NFR BLD AUTO: 83 % (ref 31–73)
NITRITE UR QL STRIP: (no result)
OPIATES UR-MCNC: (no result) NG/ML
PCP SERPL-MCNC: (no result) MG/DL
PLATELET # BLD AUTO: 269 X10^3/UL (ref 140–400)
POTASSIUM SERPL-SCNC: 4.8 MMOL/L (ref 3.5–5.1)
RBC # BLD AUTO: 5.11 X10^6/UL (ref 4.3–5.7)
RBC #/AREA URNS HPF: (no result) /HPF (ref 0–2)
SODIUM SERPL-SCNC: 135 MMOL/L (ref 136–145)
SP GR UR STRIP: >=1.03
SQUAMOUS #/AREA URNS LPF: (no result) /LPF
UROBILINOGEN UR-MCNC: 0.2 MG/DL
WBC # BLD AUTO: 12.4 X10^3/UL (ref 4–11)
WBC #/AREA URNS HPF: (no result) /HPF (ref 0–4)

## 2022-02-23 PROCEDURE — 85025 COMPLETE CBC W/AUTO DIFF WBC: CPT

## 2022-02-23 PROCEDURE — 99285 EMERGENCY DEPT VISIT HI MDM: CPT

## 2022-02-23 PROCEDURE — 70450 CT HEAD/BRAIN W/O DYE: CPT

## 2022-02-23 PROCEDURE — 80307 DRUG TEST PRSMV CHEM ANLYZR: CPT

## 2022-02-23 PROCEDURE — 81001 URINALYSIS AUTO W/SCOPE: CPT

## 2022-02-23 PROCEDURE — 84484 ASSAY OF TROPONIN QUANT: CPT

## 2022-02-23 PROCEDURE — 80048 BASIC METABOLIC PNL TOTAL CA: CPT

## 2022-02-23 PROCEDURE — 36415 COLL VENOUS BLD VENIPUNCTURE: CPT

## 2022-02-23 PROCEDURE — 93005 ELECTROCARDIOGRAM TRACING: CPT

## 2022-02-23 NOTE — PHYS DOC
Past History


Past Medical History:  Arthritis, Cancer, COPD, Diabetes, GERD, High 

Cholesterol, Kidney Stones, Other


Past Surgical History:  Cancer Surgery, Other


Additional Past Surgical Histo:  PROSTATE


Smoking:  Cigarettes


Alcohol Use:  None


Drug Use:  None





Adult General


Chief Complaint


Chief Complaint:  ALTERED MENTAL STATUS





[facility]


HPI





Patient is a 61-year-old male presenting with wife via POV for confusion.  This 

is a subacute issue.  Wife and patient both report this has been going on for 

weeks to months.  Patient has had numerous episodes of confusion where he 

forgets simple tasks and has questionable lapses of short and long-term memory 

without any obvious trauma, exposure, ingestion or other noteworthy event.  Wife

brought patient in today as patient was so confused yesterday that she decided 

it was safest if he stayed home, patient thought he went to work yesterday and 

was tearful when told that was not the case.  He is well covered in outpatient 

setting by primary care physician and being treated for noninsulin-dependent 

type 2 diabetes.  No prior history of dementia or evaluation of other 

intracranial abnormalities.  Admits tobacco abuse but denies alcohol or illicit 

drug abuse





Review of Systems


Review of Systems


Fourteen body systems of review of systems have been reviewed. See HPI for 

pertinent positives and negative responses, other wise all other systems are 

negative, non-pertinent or non-contributory





Allergies


Allergies





Allergies








Coded Allergies Type Severity Reaction Last Updated Verified


 


  No Known Drug Allergies    2/14/16 No











Physical Exam


Physical Exam


Constitutional: Well developed, well nourished, no acute distress, non-toxic 

appearance.  Poor hygiene overall


HENT: Normocephalic, atraumatic, bilateral external ears normal, oropharynx 

moist, no oral exudates, nose normal. 


Eyes: PERRLA, EOMI, conjunctiva normal, no discharge.  


Neck: Normal range of motion, no tenderness, supple, no stridor.  


Cardiovascular: Heart rate regular, sinus rhythm, no murmurs rubs or gallops


Lungs & Thorax:  Bilateral breath sounds clear to auscultation 


Abdomen: Bowel sounds normal, soft, no tenderness, no masses, no pulsatile 

masses.  Nonsurgical abdomen, no peritoneal signs


Skin: Warm, dry, no erythema, no rash.  


Back: No tenderness, no CVA tenderness.  


Extremities: No tenderness, no cyanosis, no clubbing, ROM intact, no edema.  


Neurologic: Alert and oriented X 3, cranial nerves II through XII intact, normal

motor & sensory function, no focal deficits noted. 


Psychologic: Tearful affect and mood.  Short and long-term memory impairment.





Current Patient Data


Vital Signs





Vital Signs








  Date Time  Temp Pulse Resp B/P (MAP) Pulse Ox O2 Delivery O2 Flow Rate FiO2


 


2/23/22 09:25 98.7 88 16 165/68 (100) 97 Room Air  








Vital Signs








  Date Time  Temp Pulse Resp B/P (MAP) Pulse Ox O2 Delivery O2 Flow Rate FiO2


 


2/23/22 09:25 98.7 88 16 165/68 (100) 97 Room Air  








Lab Results





Laboratory Tests








Test


 2/23/22


09:56 2/23/22


11:00


 


White Blood Count 12.4 x10^3/uL  


 


Red Blood Count 5.11 x10^6/uL  


 


Hemoglobin 17.0 g/dL  


 


Hematocrit 49.7 %  


 


Mean Corpuscular Volume 97 fL  


 


Mean Corpuscular Hemoglobin 33 pg  


 


Mean Corpuscular Hemoglobin


Concent 34 g/dL 


 





 


Red Cell Distribution Width 12.6 %  


 


Platelet Count 269 x10^3/uL  


 


Neutrophils (%) (Auto) 83 %  


 


Lymphocytes (%) (Auto) 12 %  


 


Monocytes (%) (Auto) 5 %  


 


Eosinophils (%) (Auto) 0 %  


 


Basophils (%) (Auto) 0 %  


 


Neutrophils # (Auto) 10.2 x10^3uL  


 


Lymphocytes # (Auto) 1.5 x10^3/uL  


 


Monocytes # (Auto) 0.6 x10^3/uL  


 


Eosinophils # (Auto) 0.0 x10^3/uL  


 


Basophils # (Auto) 0.0 x10^3/uL  


 


Sodium Level 135 mmol/L  


 


Potassium Level 4.8 mmol/L  


 


Chloride Level 99 mmol/L  


 


Carbon Dioxide Level 31 mmol/L  


 


Anion Gap 5  


 


Blood Urea Nitrogen 23 mg/dL  


 


Creatinine 1.1 mg/dL  


 


Estimated GFR


(Cockcroft-Gault) 67.8 


 





 


Glucose Level 172 mg/dL  


 


Calcium Level 9.0 mg/dL  


 


Troponin I High Sensitivity 6 ng/L  


 


Ethyl Alcohol Level < 10 mg/dL  


 


Urine Collection Type  Unknown 


 


Urine Color  Yellow 


 


Urine Clarity  Hazy 


 


Urine pH  5.5 


 


Urine Specific Gravity  >=1.030 


 


Urine Protein  30 mg/dl 


 


Urine Glucose (UA)  Neg mg/dL 


 


Urine Ketones (Stick)  15 mg/dL 


 


Urine Blood  Trace 


 


Urine Nitrite  Neg 


 


Urine Bilirubin  Small 


 


Urine Urobilinogen Dipstick  0.2 mg/dL 


 


Urine Leukocyte Esterase  Neg 


 


Urine RBC  3-5 /HPF 


 


Urine WBC  1-4 /HPF 


 


Urine Squamous Epithelial


Cells 


 Many /LPF 





 


Urine Bacteria  0 /HPF 


 


Urine Hyaline Casts  Few /HPF 


 


Urine Mucus  Mod /LPF 


 


Urine Opiates Screen  Neg 


 


Urine Methadone Screen  Neg 


 


Urine Barbiturates  Neg 


 


Urine Phencyclidine Screen  Neg 


 


Urine


Amphetamine/Methamphetamine 


 Neg 





 


Urine Benzodiazepines Screen  Neg 


 


Urine Cocaine Screen  Neg 


 


Urine Cannabinoids Screen  Neg 


 


Urine Ethyl Alcohol  Neg 











EKG


EKG


EKG ordered and interpreted by myself: 16 hours as sinus rhythm at 75 bpm, 

unremarkable intervals, no axis deviation, no obvious ischemic findings, no 

STEMI





Radiology/Procedures


Radiology/Procedures





CT HEAD/BRAIN WO 





Date: 2/23/2022 9:50 AM 





Clinical Indication: confusion 





Comparison: None.





Technique:  5 mm axial tomographic images were obtained of the head without 

contrast. These were viewed on brain and bone windows. One or more of the 

following dose reduction techniques were utilized: Automated exposure control 

(AEC), Adjustment of mA and/or kV according to patient size, Use of iterative 

reconstruction technique such as ASiR, CT scan done according to ALARA and image

gently/image wisely





Findings:





The brain parenchyma is normal in attenuation. No intra- or extra-axial mass or 

fluid collection. No acute hemorrhage. The ventricles are normal in size, shape,

and morphology. The gray-white matter junction is normal. The subarachnoid 

cisterns are patent. 





The visualized paranasal sinuses are normal.  The visualized portions of the 

orbits and globes are normal. The mastoid air cells are clear. 





The  topogram shows no lytic lesion or fracture. 





Impression:





No acute intracranial process.





Electronically signed by: Sean Damon MD (2/23/2022 10:17 AM) [id number]





Heart Score


C/O Chest Pain:  No


Risk Factors:


Risk Factors:  DM, Current or recent (<one month) smoker, HTN, HLP, family 

history of CAD, obesity.


Risk Scores:


Risk Factors:  DM, Current or recent (<one month) smoker, HTN, HLP, family 

history of CAD, obesity.





Course & Med Decision Making


Course & Med Decision Making


ABCs unremarkable


HPI physical exam and comprehensive ER work-up nonconcerning for any emergent or

surgical issues


Discussed with patient and wife at bedside no obvious infectious or easily reve

rsible process identified based on work-up today.  Discussed this might be early

signs of dementia.  Further outpatient follow-up recommended


Patient has outpatient primary care physician follow-up today which I feel is 

appropriate.  Discussed need for further outpatient testing such as Mini-Mental 

state exam in addition to consideration for outpatient neurology consultation


Advised patient to avoid operating heavy machinery and/or driving etc. until 

cleared by primary care physician and/or neurologist.  Strict return precautions

discussed prior to ER departure





Dragon Disclaimer


Dragon Disclaimer


This electronic medical record was generated, in whole or in part, using a voice

recognition dictation system.





Departure


Departure:


Impression:  


   Primary Impression:  


   Memory problem


Disposition:  01 HOME / SELF CARE / HOMELESS


Condition:  STABLE


Referrals:  


VILMA ISLAS MD (PCP)


Patient Instructions:  Confusion





Additional Instructions:  


As discussed prior to ER departure, your vitals physical exam and comprehensive 

ER work-up were nonconcerning for any emergent or surgical issues.  There was no

obvious infectious cause of your recent memory impairment.  As disclosed, you 

would benefit from further outpatient work-up for this that would involve Mini-

Mental state exams and other studies as this might be early signs of a more 

chronic process like dementia.  As disclosed, I would discuss outpatient work-up

with primary care physician as outpatient neurology consultation might be 

indicated.  In the meantime, it is advised that you avoid driving and/or 

operating other heavy machinery as doing so could put you at risk.  If any 

concerning signs or symptoms present prior to outpatient follow-up please do not

hesitate to come back for repeat evaluation.  It was a pleasure to take care of 

you and I wish you the best going forward











LUIS ARMANDO GONZÁLES DO                 Feb 23, 2022 09:30

## 2022-02-23 NOTE — RAD
CT HEAD/BRAIN WO 



Date: 2/23/2022 9:50 AM 



Clinical Indication: confusion 



Comparison: None.



Technique:  5 mm axial tomographic images were obtained of the head without contrast. These were view
ed on brain and bone windows. One or more of the following dose reduction techniques were utilized: A
utomated exposure control (AEC), Adjustment of mA and/or kV according to patient size, Use of iterati
ve reconstruction technique such as ASiR, CT scan done according to ALARA and image gently/image wise
ly



Findings:



The brain parenchyma is normal in attenuation. No intra- or extra-axial mass or fluid collection. No 
acute hemorrhage. The ventricles are normal in size, shape, and morphology. The gray-white matter michael
ction is normal. The subarachnoid cisterns are patent. 



The visualized paranasal sinuses are normal.  The visualized portions of the orbits and globes are no
rmal. The mastoid air cells are clear. 



The  topogram shows no lytic lesion or fracture. 



Impression:



No acute intracranial process.



Electronically signed by: Sean Damon MD (2/23/2022 10:17 AM) [id number]

## 2022-02-23 NOTE — EKG
[facility] [address], [place] [address]

Test Date:    2022               Test Time:    10:10:30

Pat Name:     PARUL CATHERINE              Department:   

Patient ID:   [id number]           Room:          

Gender:       M                        Technician:   CARISSA

:          [id number]               Requested By: LUIS ARMANDO GONZÁLES

Order Number: [id number]            Reading MD:     

                                 Measurements

Intervals                              Axis          

Rate:         75                       P:            -24

NJ:           112                      QRS:          68

QRSD:         82                       T:            56

QT:           356                                    

QTc:          400                                    

                           Interpretive Statements

SINUS RHYTHM

QRS(T) CONTOUR ABNORMALITY

CONSIDER ANTEROSEPTAL MYOCARDIAL DAMAGE

POSSIBLY ABNORMAL ECG

RI6.01

No previous ECG available for comparison